# Patient Record
Sex: MALE | Race: WHITE
[De-identification: names, ages, dates, MRNs, and addresses within clinical notes are randomized per-mention and may not be internally consistent; named-entity substitution may affect disease eponyms.]

---

## 2019-02-07 ENCOUNTER — HOSPITAL ENCOUNTER (EMERGENCY)
Dept: HOSPITAL 5 - ED | Age: 44
Discharge: HOME | End: 2019-02-07
Payer: SELF-PAY

## 2019-02-07 VITALS — SYSTOLIC BLOOD PRESSURE: 131 MMHG | DIASTOLIC BLOOD PRESSURE: 86 MMHG

## 2019-02-07 DIAGNOSIS — Z90.49: ICD-10-CM

## 2019-02-07 DIAGNOSIS — R11.2: Primary | ICD-10-CM

## 2019-02-07 DIAGNOSIS — F17.200: ICD-10-CM

## 2019-02-07 PROCEDURE — 99281 EMR DPT VST MAYX REQ PHY/QHP: CPT

## 2019-02-07 NOTE — EMERGENCY DEPARTMENT REPORT
ED General Adult HPI





- General


Chief complaint: Nausea/Vomiting/Diarrhea


Stated complaint: DEHYDRATION/VOMIT


Time Seen by Provider: 02/07/19 10:07


Source: patient


Mode of arrival: Ambulatory


Limitations: No Limitations





- History of Present Illness


Initial comments: 





Mr. López is a pleasant 43-year-old -American male who comes to the ER 

today with vomiting this morning.  He states that he overindulged in alcohol 

yesterday and woke up feeling bad.  Upon further probing with he and his wife it

sounds like he is drinking every other day to avoid some stressors.  He did not 

specify what stressors were.  He had a long conversation about alcoholism and 

dealing with the stress in a different way.  Patient was very forthcoming with 

his challenges and declined further medical workup.





- Related Data


                                    Allergies











Allergy/AdvReac Type Severity Reaction Status Date / Time


 


No Known Allergies Allergy   Unverified 02/07/19 09:42














ED Review of Systems


ROS: 


Stated complaint: DEHYDRATION/VOMIT


Other details as noted in HPI





Comment: All other systems reviewed and negative


Constitutional: denies: chills


ENT: denies: as per HPI


Respiratory: denies: cough


Cardiovascular: denies: dyspnea on exertion


Endocrine: denies: intolerance to cold


Gastrointestinal: as per HPI, nausea, vomiting


Genitourinary: denies: dysuria


Skin: denies: as per HPI, lesions


Neurological: denies: weakness


Psychiatric: denies: anxiety


Hematological/Lymphatic: denies: as per HPI





ED Past Medical Hx





- Past Medical History


Previous Medical History?: No





- Surgical History


Hx Appendectomy: Yes


Additional Surgical History: TESTICLES





- Family History


Family history: no significant





- Social History


Smoking Status: Current Every Day Smoker


Substance Use Type: Alcohol





ED Physical Exam





- General


Limitations: No Limitations


General appearance: alert





- Head


Head exam: Present: atraumatic





- Eye


Eye exam: Present: normal appearance


Pupils: Present: normal accommodation





- ENT


ENT exam: Present: normal exam





- Neck


Neck exam: Present: normal inspection





- Respiratory


Respiratory exam: Present: normal lung sounds bilaterally





- Cardiovascular


Cardiovascular Exam: Present: regular rate





- GI/Abdominal


GI/Abdominal exam: Present: soft, normal bowel sounds.  Absent: tenderness





- Rectal


Rectal exam: Present: deferred





- Neurological Exam


Neurological exam: Present: alert, altered, CN II-XII intact, normal gait





- Psychiatric


Psychiatric exam: Present: normal affect, normal mood





ED Course


                                   Vital Signs











  02/07/19





  09:53


 


Temperature 97.7 F


 


Pulse Rate 98 H


 


Respiratory 18





Rate 


 


Blood Pressure 131/86


 


O2 Sat by Pulse 97





Oximetry 














ED Medical Decision Making





- Medical Decision Making





long discussion with pt about stress/etoh





Dc home with plan of care


vss


no n/v


abd snt








Critical care attestation.: 


If time is entered above; I have spent that time in minutes in the direct care 

of this critically ill patient, excluding procedure time.








ED Disposition


Clinical Impression: 


 Nausea, Alcohol use





Disposition: DC-01 TO HOME OR SELFCARE


Is pt being admited?: No


Does the pt Need Aspirin: No


Condition: Stable


Forms:  Work/School Release Form(ED)


Time of Disposition: 10:16